# Patient Record
Sex: FEMALE | Race: WHITE | NOT HISPANIC OR LATINO | Employment: FULL TIME | URBAN - METROPOLITAN AREA
[De-identification: names, ages, dates, MRNs, and addresses within clinical notes are randomized per-mention and may not be internally consistent; named-entity substitution may affect disease eponyms.]

---

## 2017-07-12 PROBLEM — N83.201 RIGHT OVARIAN CYST: Status: ACTIVE | Noted: 2017-07-12

## 2017-07-13 RX ORDER — ACETAMINOPHEN AND CODEINE PHOSPHATE 120; 12 MG/5ML; MG/5ML
1 SOLUTION ORAL DAILY
COMMUNITY
End: 2021-01-18

## 2017-07-19 ENCOUNTER — HOSPITAL ENCOUNTER (OUTPATIENT)
Dept: LAB | Facility: CLINIC | Age: 27
Discharge: HOME | End: 2017-07-19
Attending: OBSTETRICS & GYNECOLOGY | Admitting: OBSTETRICS & GYNECOLOGY
Payer: COMMERCIAL

## 2017-07-19 DIAGNOSIS — Z01.812 PRE-OPERATIVE LABORATORY EXAMINATION: ICD-10-CM

## 2017-07-19 LAB
ABO + RH BLD: NORMAL
ABO + RH BLD: NORMAL
BLD GP AB SCN SERPL QL: NORMAL
BLOOD BANK CMNT PATIENT-IMP: NORMAL
HGB BLD-MCNC: 13.4 G/DL (ref 11.7–15.7)
SPECIMEN EXP DATE BLD: NORMAL

## 2017-07-19 PROCEDURE — 86900 BLOOD TYPING SEROLOGIC ABO: CPT | Performed by: OBSTETRICS & GYNECOLOGY

## 2017-07-19 PROCEDURE — 85018 HEMOGLOBIN: CPT | Performed by: OBSTETRICS & GYNECOLOGY

## 2017-07-19 PROCEDURE — 86901 BLOOD TYPING SEROLOGIC RH(D): CPT | Performed by: OBSTETRICS & GYNECOLOGY

## 2017-07-19 PROCEDURE — 86850 RBC ANTIBODY SCREEN: CPT | Performed by: OBSTETRICS & GYNECOLOGY

## 2017-07-19 PROCEDURE — 36415 COLL VENOUS BLD VENIPUNCTURE: CPT | Performed by: OBSTETRICS & GYNECOLOGY

## 2017-07-20 ENCOUNTER — ANESTHESIA EVENT (OUTPATIENT)
Dept: SURGERY | Facility: CLINIC | Age: 27
End: 2017-07-20
Payer: COMMERCIAL

## 2017-07-20 ENCOUNTER — HOSPITAL ENCOUNTER (OUTPATIENT)
Facility: CLINIC | Age: 27
Discharge: HOME | End: 2017-07-20
Attending: OBSTETRICS & GYNECOLOGY | Admitting: OBSTETRICS & GYNECOLOGY
Payer: COMMERCIAL

## 2017-07-20 ENCOUNTER — ANESTHESIA (OUTPATIENT)
Dept: SURGERY | Facility: CLINIC | Age: 27
End: 2017-07-20
Payer: COMMERCIAL

## 2017-07-20 VITALS
HEIGHT: 65 IN | WEIGHT: 199 LBS | SYSTOLIC BLOOD PRESSURE: 142 MMHG | RESPIRATION RATE: 16 BRPM | DIASTOLIC BLOOD PRESSURE: 97 MMHG | HEART RATE: 65 BPM | TEMPERATURE: 97.4 F | BODY MASS INDEX: 33.15 KG/M2 | OXYGEN SATURATION: 98 %

## 2017-07-20 DIAGNOSIS — G89.18 POSTOPERATIVE PAIN: ICD-10-CM

## 2017-07-20 DIAGNOSIS — N83.8 PARATUBAL CYST: Primary | ICD-10-CM

## 2017-07-20 DIAGNOSIS — K66.0 ABDOMINAL ADHESIONS: ICD-10-CM

## 2017-07-20 LAB — HCG SERPL QL: NEGATIVE

## 2017-07-20 PROCEDURE — 37000008 ZZH ANESTHESIA TECHNICAL FEE, 1ST 30 MIN: Performed by: OBSTETRICS & GYNECOLOGY

## 2017-07-20 PROCEDURE — 25000566 ZZH SEVOFLURANE, EA 15 MIN: Performed by: OBSTETRICS & GYNECOLOGY

## 2017-07-20 PROCEDURE — 88304 TISSUE EXAM BY PATHOLOGIST: CPT | Performed by: OBSTETRICS & GYNECOLOGY

## 2017-07-20 PROCEDURE — 36415 COLL VENOUS BLD VENIPUNCTURE: CPT | Performed by: OBSTETRICS & GYNECOLOGY

## 2017-07-20 PROCEDURE — 25000125 ZZHC RX 250: Performed by: OBSTETRICS & GYNECOLOGY

## 2017-07-20 PROCEDURE — 88304 TISSUE EXAM BY PATHOLOGIST: CPT | Mod: 26 | Performed by: OBSTETRICS & GYNECOLOGY

## 2017-07-20 PROCEDURE — S0020 INJECTION, BUPIVICAINE HYDRO: HCPCS | Performed by: OBSTETRICS & GYNECOLOGY

## 2017-07-20 PROCEDURE — 36000085 ZZH SURGERY LEVEL 8 1ST 30 MIN: Performed by: OBSTETRICS & GYNECOLOGY

## 2017-07-20 PROCEDURE — 37000009 ZZH ANESTHESIA TECHNICAL FEE, EACH ADDTL 15 MIN: Performed by: OBSTETRICS & GYNECOLOGY

## 2017-07-20 PROCEDURE — 25800025 ZZH RX 258: Performed by: OBSTETRICS & GYNECOLOGY

## 2017-07-20 PROCEDURE — 25000128 H RX IP 250 OP 636: Performed by: NURSE ANESTHETIST, CERTIFIED REGISTERED

## 2017-07-20 PROCEDURE — 25000128 H RX IP 250 OP 636: Performed by: ANESTHESIOLOGY

## 2017-07-20 PROCEDURE — 27210794 ZZH OR GENERAL SUPPLY STERILE: Performed by: OBSTETRICS & GYNECOLOGY

## 2017-07-20 PROCEDURE — 71000012 ZZH RECOVERY PHASE 1 LEVEL 1 FIRST HR: Performed by: OBSTETRICS & GYNECOLOGY

## 2017-07-20 PROCEDURE — 40000306 ZZH STATISTIC PRE PROC ASSESS II: Performed by: OBSTETRICS & GYNECOLOGY

## 2017-07-20 PROCEDURE — 25000125 ZZHC RX 250: Performed by: NURSE ANESTHETIST, CERTIFIED REGISTERED

## 2017-07-20 PROCEDURE — 27211024 ZZHC OR SUPPLY OTHER OPNP: Performed by: OBSTETRICS & GYNECOLOGY

## 2017-07-20 PROCEDURE — 84703 CHORIONIC GONADOTROPIN ASSAY: CPT | Performed by: OBSTETRICS & GYNECOLOGY

## 2017-07-20 PROCEDURE — 25000132 ZZH RX MED GY IP 250 OP 250 PS 637: Performed by: OBSTETRICS & GYNECOLOGY

## 2017-07-20 PROCEDURE — 71000013 ZZH RECOVERY PHASE 1 LEVEL 1 EA ADDTL HR: Performed by: OBSTETRICS & GYNECOLOGY

## 2017-07-20 PROCEDURE — 25000128 H RX IP 250 OP 636: Performed by: OBSTETRICS & GYNECOLOGY

## 2017-07-20 PROCEDURE — 71000027 ZZH RECOVERY PHASE 2 EACH 15 MINS: Performed by: OBSTETRICS & GYNECOLOGY

## 2017-07-20 PROCEDURE — 36000087 ZZH SURGERY LEVEL 8 EA 15 ADDTL MIN: Performed by: OBSTETRICS & GYNECOLOGY

## 2017-07-20 RX ORDER — ACETAMINOPHEN 325 MG/1
975 TABLET ORAL ONCE
Status: COMPLETED | OUTPATIENT
Start: 2017-07-20 | End: 2017-07-20

## 2017-07-20 RX ORDER — FENTANYL CITRATE 50 UG/ML
25-50 INJECTION, SOLUTION INTRAMUSCULAR; INTRAVENOUS
Status: DISCONTINUED | OUTPATIENT
Start: 2017-07-20 | End: 2017-07-20 | Stop reason: HOSPADM

## 2017-07-20 RX ORDER — ONDANSETRON 2 MG/ML
4 INJECTION INTRAMUSCULAR; INTRAVENOUS EVERY 30 MIN PRN
Status: DISCONTINUED | OUTPATIENT
Start: 2017-07-20 | End: 2017-07-20 | Stop reason: HOSPADM

## 2017-07-20 RX ORDER — OXYCODONE AND ACETAMINOPHEN 5; 325 MG/1; MG/1
1 TABLET ORAL ONCE
Status: COMPLETED | OUTPATIENT
Start: 2017-07-20 | End: 2017-07-20

## 2017-07-20 RX ORDER — GLYCOPYRROLATE 0.2 MG/ML
INJECTION, SOLUTION INTRAMUSCULAR; INTRAVENOUS PRN
Status: DISCONTINUED | OUTPATIENT
Start: 2017-07-20 | End: 2017-07-20

## 2017-07-20 RX ORDER — SODIUM CHLORIDE, SODIUM LACTATE, POTASSIUM CHLORIDE, CALCIUM CHLORIDE 600; 310; 30; 20 MG/100ML; MG/100ML; MG/100ML; MG/100ML
INJECTION, SOLUTION INTRAVENOUS CONTINUOUS
Status: DISCONTINUED | OUTPATIENT
Start: 2017-07-20 | End: 2017-07-20 | Stop reason: HOSPADM

## 2017-07-20 RX ORDER — PROPOFOL 10 MG/ML
INJECTION, EMULSION INTRAVENOUS PRN
Status: DISCONTINUED | OUTPATIENT
Start: 2017-07-20 | End: 2017-07-20

## 2017-07-20 RX ORDER — DEXAMETHASONE SODIUM PHOSPHATE 4 MG/ML
INJECTION, SOLUTION INTRA-ARTICULAR; INTRALESIONAL; INTRAMUSCULAR; INTRAVENOUS; SOFT TISSUE PRN
Status: DISCONTINUED | OUTPATIENT
Start: 2017-07-20 | End: 2017-07-20

## 2017-07-20 RX ORDER — NALOXONE HYDROCHLORIDE 0.4 MG/ML
.1-.4 INJECTION, SOLUTION INTRAMUSCULAR; INTRAVENOUS; SUBCUTANEOUS
Status: DISCONTINUED | OUTPATIENT
Start: 2017-07-20 | End: 2017-07-20 | Stop reason: HOSPADM

## 2017-07-20 RX ORDER — CEFAZOLIN SODIUM 1 G/3ML
1 INJECTION, POWDER, FOR SOLUTION INTRAMUSCULAR; INTRAVENOUS SEE ADMIN INSTRUCTIONS
Status: DISCONTINUED | OUTPATIENT
Start: 2017-07-20 | End: 2017-07-20 | Stop reason: HOSPADM

## 2017-07-20 RX ORDER — BUPIVACAINE HYDROCHLORIDE 5 MG/ML
INJECTION, SOLUTION EPIDURAL; INTRACAUDAL PRN
Status: DISCONTINUED | OUTPATIENT
Start: 2017-07-20 | End: 2017-07-20 | Stop reason: HOSPADM

## 2017-07-20 RX ORDER — ONDANSETRON 4 MG/1
4 TABLET, ORALLY DISINTEGRATING ORAL EVERY 30 MIN PRN
Status: DISCONTINUED | OUTPATIENT
Start: 2017-07-20 | End: 2017-07-20 | Stop reason: HOSPADM

## 2017-07-20 RX ORDER — FENTANYL CITRATE 50 UG/ML
INJECTION, SOLUTION INTRAMUSCULAR; INTRAVENOUS PRN
Status: DISCONTINUED | OUTPATIENT
Start: 2017-07-20 | End: 2017-07-20

## 2017-07-20 RX ORDER — IBUPROFEN 600 MG/1
600 TABLET, FILM COATED ORAL
Status: DISCONTINUED | OUTPATIENT
Start: 2017-07-20 | End: 2017-07-20 | Stop reason: HOSPADM

## 2017-07-20 RX ORDER — IBUPROFEN 600 MG/1
600 TABLET, FILM COATED ORAL EVERY 6 HOURS PRN
Qty: 30 TABLET | Refills: 0 | Status: SHIPPED | OUTPATIENT
Start: 2017-07-20 | End: 2022-05-12

## 2017-07-20 RX ORDER — OXYCODONE AND ACETAMINOPHEN 5; 325 MG/1; MG/1
1-2 TABLET ORAL
Status: COMPLETED | OUTPATIENT
Start: 2017-07-20 | End: 2017-07-20

## 2017-07-20 RX ORDER — OXYCODONE AND ACETAMINOPHEN 5; 325 MG/1; MG/1
1-2 TABLET ORAL EVERY 6 HOURS PRN
Qty: 20 TABLET | Refills: 0 | Status: SHIPPED | OUTPATIENT
Start: 2017-07-20 | End: 2021-01-18

## 2017-07-20 RX ORDER — CEFAZOLIN SODIUM 2 G/100ML
2 INJECTION, SOLUTION INTRAVENOUS
Status: COMPLETED | OUTPATIENT
Start: 2017-07-20 | End: 2017-07-20

## 2017-07-20 RX ORDER — PROPOFOL 10 MG/ML
INJECTION, EMULSION INTRAVENOUS CONTINUOUS PRN
Status: DISCONTINUED | OUTPATIENT
Start: 2017-07-20 | End: 2017-07-20

## 2017-07-20 RX ORDER — LIDOCAINE HYDROCHLORIDE 10 MG/ML
INJECTION, SOLUTION INFILTRATION; PERINEURAL PRN
Status: DISCONTINUED | OUTPATIENT
Start: 2017-07-20 | End: 2017-07-20

## 2017-07-20 RX ORDER — MEPERIDINE HYDROCHLORIDE 25 MG/ML
12.5 INJECTION INTRAMUSCULAR; INTRAVENOUS; SUBCUTANEOUS
Status: DISCONTINUED | OUTPATIENT
Start: 2017-07-20 | End: 2017-07-20 | Stop reason: HOSPADM

## 2017-07-20 RX ADMIN — SODIUM CHLORIDE, POTASSIUM CHLORIDE, SODIUM LACTATE AND CALCIUM CHLORIDE: 600; 310; 30; 20 INJECTION, SOLUTION INTRAVENOUS at 12:26

## 2017-07-20 RX ADMIN — OXYCODONE HYDROCHLORIDE AND ACETAMINOPHEN 1 TABLET: 5; 325 TABLET ORAL at 13:50

## 2017-07-20 RX ADMIN — PROPOFOL 50 MCG/KG/MIN: 10 INJECTION, EMULSION INTRAVENOUS at 11:19

## 2017-07-20 RX ADMIN — SODIUM CHLORIDE, POTASSIUM CHLORIDE, SODIUM LACTATE AND CALCIUM CHLORIDE: 600; 310; 30; 20 INJECTION, SOLUTION INTRAVENOUS at 11:06

## 2017-07-20 RX ADMIN — HYDROMORPHONE HYDROCHLORIDE 0.5 MG: 1 INJECTION, SOLUTION INTRAMUSCULAR; INTRAVENOUS; SUBCUTANEOUS at 11:45

## 2017-07-20 RX ADMIN — HYDROMORPHONE HYDROCHLORIDE 0.5 MG: 1 INJECTION, SOLUTION INTRAMUSCULAR; INTRAVENOUS; SUBCUTANEOUS at 11:41

## 2017-07-20 RX ADMIN — PROPOFOL 200 MG: 10 INJECTION, EMULSION INTRAVENOUS at 11:16

## 2017-07-20 RX ADMIN — ACETAMINOPHEN 975 MG: 325 TABLET, FILM COATED ORAL at 10:36

## 2017-07-20 RX ADMIN — GLYCOPYRROLATE 0.2 MG: 0.2 INJECTION, SOLUTION INTRAMUSCULAR; INTRAVENOUS at 11:16

## 2017-07-20 RX ADMIN — OXYCODONE HYDROCHLORIDE AND ACETAMINOPHEN 1 TABLET: 5; 325 TABLET ORAL at 16:22

## 2017-07-20 RX ADMIN — LIDOCAINE HYDROCHLORIDE 30 MG: 10 INJECTION, SOLUTION INFILTRATION; PERINEURAL at 11:16

## 2017-07-20 RX ADMIN — ROCURONIUM BROMIDE 40 MG: 10 INJECTION INTRAVENOUS at 11:16

## 2017-07-20 RX ADMIN — FENTANYL CITRATE 100 MCG: 50 INJECTION, SOLUTION INTRAMUSCULAR; INTRAVENOUS at 11:16

## 2017-07-20 RX ADMIN — MIDAZOLAM HYDROCHLORIDE 2 MG: 1 INJECTION, SOLUTION INTRAMUSCULAR; INTRAVENOUS at 11:06

## 2017-07-20 RX ADMIN — SODIUM CHLORIDE, POTASSIUM CHLORIDE, SODIUM LACTATE AND CALCIUM CHLORIDE: 600; 310; 30; 20 INJECTION, SOLUTION INTRAVENOUS at 16:19

## 2017-07-20 RX ADMIN — CEFAZOLIN SODIUM 2 G: 2 INJECTION, SOLUTION INTRAVENOUS at 11:10

## 2017-07-20 RX ADMIN — FENTANYL CITRATE 50 MCG: 50 INJECTION INTRAMUSCULAR; INTRAVENOUS at 13:12

## 2017-07-20 RX ADMIN — FENTANYL CITRATE 50 MCG: 50 INJECTION INTRAMUSCULAR; INTRAVENOUS at 13:26

## 2017-07-20 RX ADMIN — DEXAMETHASONE SODIUM PHOSPHATE 8 MG: 4 INJECTION, SOLUTION INTRA-ARTICULAR; INTRALESIONAL; INTRAMUSCULAR; INTRAVENOUS; SOFT TISSUE at 11:16

## 2017-07-20 ASSESSMENT — ENCOUNTER SYMPTOMS: DYSRHYTHMIAS: 0

## 2017-07-20 NOTE — DISCHARGE INSTRUCTIONS
GENERAL ANESTHESIA OR SEDATION ADULT DISCHARGE INSTRUCTIONS   SPECIAL PRECAUTIONS FOR 24 HOURS AFTER SURGERY    IT IS NOT UNUSUAL TO FEEL LIGHT-HEADED OR FAINT, UP TO 24 HOURS AFTER SURGERY OR WHILE TAKING PAIN MEDICATION.  IF YOU HAVE THESE SYMPTOMS; SIT FOR A FEW MINUTES BEFORE STANDING AND HAVE SOMEONE ASSIST YOU WHEN YOU GET UP TO WALK OR USE THE BATHROOM.    YOU SHOULD REST AND RELAX FOR THE NEXT 24 HOURS AND YOU MUST MAKE ARRANGEMENTS TO HAVE SOMEONE STAY WITH YOU FOR AT LEAST 24 HOURS AFTER YOUR DISCHARGE.  AVOID HAZARDOUS AND STRENUOUS ACTIVITIES.  DO NOT MAKE IMPORTANT DECISIONS FOR 24 HOURS.    DO NOT DRIVE ANY VEHICLE OR OPERATE MECHANICAL EQUIPMENT FOR 24 HOURS FOLLOWING THE END OF YOUR SURGERY.  EVEN THOUGH YOU MAY FEEL NORMAL, YOUR REACTIONS MAY BE AFFECTED BY THE MEDICATION YOU HAVE RECEIVED.    DO NOT DRINK ALCOHOLIC BEVERAGES FOR 24 HOURS FOLLOWING YOUR SURGERY.    DRINK CLEAR LIQUIDS (APPLE JUICE, GINGER ALE, 7-UP, BROTH, ETC.).  PROGRESS TO YOUR REGULAR DIET AS YOU FEEL ABLE.    YOU MAY HAVE A DRY MOUTH, A SORE THROAT, MUSCLES ACHES OR TROUBLE SLEEPING.  THESE SHOULD GO AWAY AFTER 24 HOURS.    CALL YOUR DOCTOR FOR ANY OF THE FOLLOWING:  SIGNS OF INFECTION (FEVER, GROWING TENDERNESS AT THE SURGERY SITE, A LARGE AMOUNT OF DRAINAGE OR BLEEDING, SEVERE PAIN, FOUL-SMELLING DRAINAGE, REDNESS OR SWELLING.    IT HAS BEEN OVER 8 TO 10 HOURS SINCE SURGERY AND YOU ARE STILL NOT ABLE TO URINATE (PASS WATER).     LAPAROSCOPY, HYSTEROSCOPY OR PELVISCOPY DISCHARGE INSTRUCTIONS    PLEASE RETURN TO THE CLINIC IN:  ____1 WEEK  ____2 WEEKS  ____4 WEEKS  ____6 WEEKS  MAKE THIS APPOINTMENT AFTER YOU GET HOME IF IT HAS NOT ALREADY BEEN SCHEDULED.    DO NOT DRIVE A CAR, DRINK ALCOHOL OR USE MACHINERY FOR THE NEXT 24 HOURS.  YOU SHOULD WAIT UNTIL YOU HAVE RECOVERED BEFORE MAKING ANY IMPORTANT DECISIONS.    PAIN  YOU MAY HAVE CRAMPS, SHOULDER PAIN OR A LOW BACKACHE FOR 24 TO 48 HOURS.  TYLENOL (ACETAMINOPHEN) OR MOTRIN  (IBUPROFEN) MAY HELP, OR YOUR DOCTOR MAY GIVE YOU PAIN MEDICINE.  CALL YOUR DOCTOR IF PAIN CANNOT BE CONTROLLED.    BLEEDING OR VAGINAL DISCHARGE  YOU MAY HAVE SOME BLEEDING OR DISCHARGE FOR UP TO A WEEK OR LONGER.  DO NOT DOUCHE, USE TAMPONS OR HAVE SEX (INTERCOURSE) FOR ______DAYS.  CALL YOUR DOCTOR IF YOU SOAK MORE THAN ONE MAXI PAD (SANITARY NAPKIN) PER HOUR, OR IF YOU PASS LARGE BLOOD CLOTS.    FEVER  YOU MAY HAVE A LOW FEVER FOR THE FIRST TWO DAYS.  CALL YOUR DOCTOR IF IT GOES OVER 101 DEGREES.    NAUSEA  IF YOU HAVE NAUSEA (FEEL SICK TO YOUR STOMACH), STAY IN BED.  TRY DRINKING A SMALL AMOUNT OF 7-UP, TEA OR SOUP.    SWOLLEN BELLY  IF YOUR ABDOMEN (BELLY AREA) FEELS FIRM OR SWOLLEN, CALL YOUR DOCTOR.    DIZZINESS AND WEAKNESS  YOU MAY FEEL DIZZY OR WEAK FOR A FEW DAYS.  IF SO, YOU SHOULD REST OFTEN, STAND UP SLOWLY AND USE CARE WHEN CLIMBING STAIRS.    DIET AND ACTIVITY  EAT LIGHT MEALS AND DRINK PLENTY OF FLUIDS FOR THE FIRST 24 HOURS (OR LONGER, IF YOU HAVE NAUSEA).  WAIT 5 DAYS BEFORE BATHING.  SHOWERS ARE OKAY.  MOST WOMEN CAN RETURN TO WORK AFTER 24 HOURS.  YOU MAY GO BACK TO YOUR OTHER ACTIVITIES AFTER YOUR PAIN GOES AWAY.      IF YOU HAVE STITCHES  YOU MAY REMOVE YOUR BANDAGE THE DAY AFTER TREATMENT.  YOUR DOCTOR WILL TELL YOU IF YOUR STITCHES NEED TO BE REMOVED.  SOME STITCHES DISSOLVE OVER TIME.           Maximum acetaminophen (Tylenol) dose from all sources should not exceed 4 grams (4000 mg) per day.  You received 975 mg at 10:35 am   1 table of percocet given at 1:50 pm

## 2017-07-20 NOTE — BRIEF OP NOTE
[facility] Brief Operative Note    Pre-operative diagnosis: Right ovarian cyst   Post-operative diagnosis Right paratubal cyst, Extensive abdominal adhesions   Procedure: Procedure(s):  Anticipated Davinci robotic-assisted laparoscopic right ovarian cystectomy converted to Laparoscopic right paratubal cystectomy, Extensive Lysis of adhesions - Wound Class: II-Clean Contaminated   Surgeon(s): Surgeon(s) and Role:     * Jose Clark MD - Primary   Estimated blood loss: * No values recorded between 7/20/2017 11:34 AM and 7/20/2017 12:39 PM *    Specimens:   ID Type Source Tests Collected by Time Destination   A : right peritubular cyst Tissue Fallopian Tube, Right SURGICAL PATHOLOGY EXAM Jose Clark MD 7/20/2017 12:35 PM       Findings: EUA -uterus NSSC, AV, no adnexal masses; Scope - extensive omental and mesenteric adhesions to anterior abdominal wall obscuring the pelvis requiring adhesiolysis, normal uterus, normal right tube except 2 cm lobulated paratubal cyst, normal right ovary.  Left tube s/p partial salpingectomy.  Left ovary adherent to left side wall and partially obscured by sigmoid colon but o/w normal.  CDS normal.  Normal upper abd except s/p cholecystectomy.

## 2017-07-20 NOTE — ANESTHESIA POSTPROCEDURE EVALUATION
Patient: Shagufta Cristina    Procedure(s):  Anticapated Davinci  robotic assisted laparoscopic right ovarian cystectomy converted to  Laproscopic right peritubular cystectomy, extensive release of adhesions - Wound Class: II-Clean Contaminated    Diagnosis:right ovarian cyst  Diagnosis Additional Information: Pre-operative diagnosis: Right ovarian cyst  Post-operative diagnosis Right paratubal cyst, Extensive abdominal adhesions  Procedure: Procedure(s):  Anticapated Davinci  robotic assisted laparoscopic right ovarian cystectomy converted to  Laproscopic,  right paratubal cystectomy, Extensive Lysis of adhesions - Wound Class: II-Clean Contaminated        Anesthesia Type:  General, ETT    Note:  Anesthesia Post Evaluation    Patient location during evaluation: Phase 2  Patient participation: Able to fully participate in evaluation  Level of consciousness: awake  Pain management: adequate  Airway patency: patent  Cardiovascular status: acceptable  Respiratory status: acceptable  Hydration status: euvolemic  PONV: controlled     Anesthetic complications: None          Last vitals:  Vitals:    07/20/17 1326 07/20/17 1410 07/20/17 1622   BP:  (!) 143/95 131/86   Pulse:      Resp:  16 16   Temp:  97.2  F (36.2  C)    SpO2: 97% 98% 98%         Electronically Signed By: Javi Dixon MD  July 20, 2017  5:23 PM

## 2017-07-20 NOTE — ANESTHESIA CARE TRANSFER NOTE
Patient: Shagufta Cristina    Procedure(s):  Anticapated Davinci  robotic assisted laparoscopic right ovarian cystectomy converted to  Laproscopic right peritubular cystectomy, extensive release of adhesions - Wound Class: II-Clean Contaminated    Diagnosis: right ovarian cyst  Diagnosis Additional Information: No value filed.    Anesthesia Type:   General, ETT     Note:  Airway :Face Mask  Patient transferred to:PACU        Vitals: (Last set prior to Anesthesia Care Transfer)    CRNA VITALS  7/20/2017 1212 - 7/20/2017 1249      7/20/2017             NIBP: 122/71    NIBP Mean: 89                Electronically Signed By: DAQUAN Hqa CRNA  July 20, 2017  12:49 PM

## 2017-07-20 NOTE — IP AVS SNAPSHOT
MRN:[id number]                      After Visit Summary   7/20/2017    Shagufta Cristina    MRN: [id number]           Thank you!     Thank you for choosing [facility] for your care. Our goal is always to provide you with excellent care. Hearing back from our patients is one way we can continue to improve our services. Please take a few minutes to complete the written survey that you may receive in the mail after you visit. If you would like to speak to someone directly about your visit please contact Patient Relations at [phone]. Thank you!          Patient Information     Date Of Birth          1990        About your hospital stay     You were admitted on:  July 20, 2017 You last received care in the:  [facility]    You were discharged on:  July 20, 2017       Who to Call     For medical emergencies, please call 911.  For non-urgent questions about your medical care, please call your primary care provider or clinic, None  For questions related to your surgery, please call your surgery clinic        Attending Provider     Provider Specialty    Jose Clark MD OB/Gyn       Primary Care Provider    [facility]      After Care Instructions     Discharge Instructions       Pelvic Rest. No tampons, douching or intercourse for 2 weeks.            Discharge Instructions       Patient may return to work POD  10            Discharge Instructions       Patient to arrange follow up appointment if needed.            Shower       Shower on Post-op day  1.   DO NOT take a bath for 2 weeks.                  Further instructions from your care team       GENERAL ANESTHESIA OR SEDATION ADULT DISCHARGE INSTRUCTIONS   SPECIAL PRECAUTIONS FOR 24 HOURS AFTER SURGERY    IT IS NOT UNUSUAL TO FEEL LIGHT-HEADED OR FAINT, UP TO 24 HOURS AFTER SURGERY OR WHILE TAKING PAIN MEDICATION.  IF YOU HAVE THESE SYMPTOMS; SIT FOR A FEW MINUTES BEFORE STANDING AND HAVE SOMEONE ASSIST  YOU WHEN YOU GET UP TO WALK OR USE THE BATHROOM.    YOU SHOULD REST AND RELAX FOR THE NEXT 24 HOURS AND YOU MUST MAKE ARRANGEMENTS TO HAVE SOMEONE STAY WITH YOU FOR AT LEAST 24 HOURS AFTER YOUR DISCHARGE.  AVOID HAZARDOUS AND STRENUOUS ACTIVITIES.  DO NOT MAKE IMPORTANT DECISIONS FOR 24 HOURS.    DO NOT DRIVE ANY VEHICLE OR OPERATE MECHANICAL EQUIPMENT FOR 24 HOURS FOLLOWING THE END OF YOUR SURGERY.  EVEN THOUGH YOU MAY FEEL NORMAL, YOUR REACTIONS MAY BE AFFECTED BY THE MEDICATION YOU HAVE RECEIVED.    DO NOT DRINK ALCOHOLIC BEVERAGES FOR 24 HOURS FOLLOWING YOUR SURGERY.    DRINK CLEAR LIQUIDS (APPLE JUICE, GINGER ALE, 7-UP, BROTH, ETC.).  PROGRESS TO YOUR REGULAR DIET AS YOU FEEL ABLE.    YOU MAY HAVE A DRY MOUTH, A SORE THROAT, MUSCLES ACHES OR TROUBLE SLEEPING.  THESE SHOULD GO AWAY AFTER 24 HOURS.    CALL YOUR DOCTOR FOR ANY OF THE FOLLOWING:  SIGNS OF INFECTION (FEVER, GROWING TENDERNESS AT THE SURGERY SITE, A LARGE AMOUNT OF DRAINAGE OR BLEEDING, SEVERE PAIN, FOUL-SMELLING DRAINAGE, REDNESS OR SWELLING.    IT HAS BEEN OVER 8 TO 10 HOURS SINCE SURGERY AND YOU ARE STILL NOT ABLE TO URINATE (PASS WATER).     LAPAROSCOPY, HYSTEROSCOPY OR PELVISCOPY DISCHARGE INSTRUCTIONS    PLEASE RETURN TO THE CLINIC IN:  ____1 WEEK  ____2 WEEKS  ____4 WEEKS  ____6 WEEKS  MAKE THIS APPOINTMENT AFTER YOU GET HOME IF IT HAS NOT ALREADY BEEN SCHEDULED.    DO NOT DRIVE A CAR, DRINK ALCOHOL OR USE MACHINERY FOR THE NEXT 24 HOURS.  YOU SHOULD WAIT UNTIL YOU HAVE RECOVERED BEFORE MAKING ANY IMPORTANT DECISIONS.    PAIN  YOU MAY HAVE CRAMPS, SHOULDER PAIN OR A LOW BACKACHE FOR 24 TO 48 HOURS.  TYLENOL (ACETAMINOPHEN) OR MOTRIN (IBUPROFEN) MAY HELP, OR YOUR DOCTOR MAY GIVE YOU PAIN MEDICINE.  CALL YOUR DOCTOR IF PAIN CANNOT BE CONTROLLED.    BLEEDING OR VAGINAL DISCHARGE  YOU MAY HAVE SOME BLEEDING OR DISCHARGE FOR UP TO A WEEK OR LONGER.  DO NOT DOUCHE, USE TAMPONS OR HAVE SEX (INTERCOURSE) FOR ______DAYS.  CALL YOUR DOCTOR IF YOU SOAK  "MORE THAN ONE MAXI PAD (SANITARY NAPKIN) PER HOUR, OR IF YOU PASS LARGE BLOOD CLOTS.    FEVER  YOU MAY HAVE A LOW FEVER FOR THE FIRST TWO DAYS.  CALL YOUR DOCTOR IF IT GOES OVER 101 DEGREES.    NAUSEA  IF YOU HAVE NAUSEA (FEEL SICK TO YOUR STOMACH), STAY IN BED.  TRY DRINKING A SMALL AMOUNT OF 7-UP, TEA OR SOUP.    SWOLLEN BELLY  IF YOUR ABDOMEN (BELLY AREA) FEELS FIRM OR SWOLLEN, CALL YOUR DOCTOR.    DIZZINESS AND WEAKNESS  YOU MAY FEEL DIZZY OR WEAK FOR A FEW DAYS.  IF SO, YOU SHOULD REST OFTEN, STAND UP SLOWLY AND USE CARE WHEN CLIMBING STAIRS.    DIET AND ACTIVITY  EAT LIGHT MEALS AND DRINK PLENTY OF FLUIDS FOR THE FIRST 24 HOURS (OR LONGER, IF YOU HAVE NAUSEA).  WAIT 5 DAYS BEFORE BATHING.  SHOWERS ARE OKAY.  MOST WOMEN CAN RETURN TO WORK AFTER 24 HOURS.  YOU MAY GO BACK TO YOUR OTHER ACTIVITIES AFTER YOUR PAIN GOES AWAY.      IF YOU HAVE STITCHES  YOU MAY REMOVE YOUR BANDAGE THE DAY AFTER TREATMENT.  YOUR DOCTOR WILL TELL YOU IF YOUR STITCHES NEED TO BE REMOVED.  SOME STITCHES DISSOLVE OVER TIME.           Maximum acetaminophen (Tylenol) dose from all sources should not exceed 4 grams (4000 mg) per day.  You received 975 mg at 10:35 am   1 table of percocet given at 1:50 pm            Pending Results     Date and Time Order Name Status Description    7/20/2017 1221 Surgical pathology exam In process             Admission Information     Date & Time Provider Department Dept. Phone    7/20/2017 Jose Clark MD [facility] [phone]      Your Vitals Were     Blood Pressure Pulse Temperature Respirations Height Weight    144/85 65 97  F (36.1  C) (Temporal) 15 1.651 m (5' 5\") 90.3 kg (199 lb)    Last Period Pulse Oximetry BMI (Body Mass Index)             07/06/2017 97% 33.12 kg/m2         Work[employer] Information     [employer] lets you send messages to your doctor, view your test results, renew your prescriptions, schedule appointments and more. To sign up, go to [email or web address] " ". Click on \"Log in\" on the left side of the screen, which will take you to the Welcome page. Then click on \"Sign up Now\" on the right side of the page.     You will be asked to enter the access code listed below, as well as some personal information. Please follow the directions to create your username and password.     Your access code is: [email or web address]  Expires: 10/18/2017  2:03 PM     Your access code will  in 90 days. If you need help or a new code, please call your [place] clinic or [phone].        Care EveryWhere ID     This is your Care EveryWhere ID. This could be used by other organizations to access your [place] medical records  [id number]        Equal Access to Services     ANNABEL SUGGS : Delmar Cristina, washobha hamilton, pj amesalgautam valencia, lina ashford . So [facility] [phone].    ATENCIÓN: Si habla español, tiene a barbour disposición servicios gratuitos de asistencia lingüística. Llame al [phone].    We comply with applicable federal civil rights laws and [place] laws. We do not discriminate on the basis of race, color, national origin, age, disability sex, sexual orientation or gender identity.               Review of your medicines      START taking        Dose / Directions    oxyCODONE-acetaminophen 5-325 MG per tablet   Commonly known as:  PERCOCET   Used for:  Postoperative pain        Dose:  1-2 tablet   Take 1-2 tablets by mouth every 6 hours as needed for moderate to severe pain   Quantity:  20 tablet   Refills:  0         CONTINUE these medicines which have NOT CHANGED        Dose / Directions    ibuprofen 600 MG tablet   Commonly known as:  ADVIL/MOTRIN   Used for:  Postoperative pain        Dose:  600 mg   Take 1 tablet (600 mg) by mouth every 6 hours as needed for pain Take w/ food   Quantity:  30 tablet   Refills:  0       norethindrone 0.35 MG per tablet   Commonly known as:  MICRONOR        Dose:  1 tablet   Take 1 tablet by " mouth daily   Refills:  0            Where to get your medicines      Some of these will need a paper prescription and others can be bought over the counter. Ask your nurse if you have questions.     Bring a paper prescription for each of these medications     ibuprofen 600 MG tablet    oxyCODONE-acetaminophen 5-325 MG per tablet                Protect others around you: Learn how to safely use, store and throw away your medicines at www.disposemymeds.org.             Medication List: This is a list of all your medications and when to take them. Check marks below indicate your daily home schedule. Keep this list as a reference.      Medications           Morning Afternoon Evening Bedtime As Needed    ibuprofen 600 MG tablet   Commonly known as:  ADVIL/MOTRIN   Take 1 tablet (600 mg) by mouth every 6 hours as needed for pain Take w/ food                                norethindrone 0.35 MG per tablet   Commonly known as:  MICRONOR   Take 1 tablet by mouth daily                                oxyCODONE-acetaminophen 5-325 MG per tablet   Commonly known as:  PERCOCET   Take 1-2 tablets by mouth every 6 hours as needed for moderate to severe pain   Last time this was given:  1 tablet on 7/20/2017  1:50 PM

## 2017-07-20 NOTE — ANESTHESIA PREPROCEDURE EVALUATION
Anesthesia Evaluation     . Pt has had prior anesthetic. Type: General    No history of anesthetic complications          ROS/MED HX    ENT/Pulmonary:      (-) asthma, sleep apnea and Other pulmonary disease   Neurologic:      (-) TIA, Other neuro hx and Dementia   Cardiovascular:        (-) hypertension, CAD, CHF, arrhythmias, pulmonary hypertension and dyslipidemia   METS/Exercise Tolerance:     Hematologic:        (-) anemia   Musculoskeletal:        (-) arthritis   GI/Hepatic:        (-) GERD, hiatal hernia and hepatitis   Renal/Genitourinary:     (+) Other Renal/ Genitourinary, Ovarian Cyst   (-) renal disease   Endo:     (+) Obesity, .   (-) Type I DM, Type II DM, thyroid disease and chronic steroid usage   Psychiatric:        (-) psychiatric history   Infectious Disease:  - neg infectious disease ROS       Malignancy:      - no malignancy   Other:    - neg other ROS                 Physical Exam      Airway   Mallampati: II  TM distance: >3 FB  Neck ROM: full    Dental     Cardiovascular   Rhythm and rate: regular and normal  (-) no murmur    Pulmonary    breath sounds clear to auscultation    Other findings: Lab Test        07/19/17 11/06/16 09/05/14                       1040          0130          0654          WBC           --          14.2*         --           HGB          13.4         13.5         11.7          MCV           --          86            --           PLT           --          308           --            Lab Test        11/06/16 09/05/14                       0130          0654          NA           137           --           POTASSIUM    3.6           --           CHLORIDE     105           --           CO2          25            --           BUN          11            --           CR           0.63         0.53          ANIONGAP     7             --           JES          8.7           --           GLC          103*          --                         Anesthesia  Plan      History & Physical Review  History and physical reviewed and following examination; no interval change.    ASA Status:  2 .    NPO Status:  > 8 hours    Plan for General and ETT with Propofol induction. Maintenance will be Balanced.    PONV prophylaxis:  Ondansetron (or other 5HT-3) and Dexamethasone or Solumedrol  Propofol gtt + Sevo + 60% O2 please      Postoperative Care  Postoperative pain management:  IV analgesics and Oral pain medications.      Consents  Anesthetic plan, risks, benefits and alternatives discussed with:  Patient..                          .

## 2017-07-20 NOTE — IP AVS SNAPSHOT
[facility]    [address]    [facility] [address]    Phone:  [phone]    Fax:  [phone]                                       After Visit Summary   7/20/2017    Shagufta Cristina    MRN: [id number]           After Visit Summary Signature Page     I have received my discharge instructions, and my questions have been answered. I have discussed any challenges I see with this plan with the nurse or doctor.    ..........................................................................................................................................  Patient/Patient Representative Signature      ..........................................................................................................................................  Patient Representative Print Name and Relationship to Patient    ..................................................               ................................................  Date                                            Time    ..........................................................................................................................................  Reviewed by Signature/Title    ...................................................              ..............................................  Date                                                            Time

## 2017-07-21 LAB — COPATH REPORT: NORMAL

## 2017-07-21 NOTE — OR NURSING
Pt had been having difficult time voiding.  Pt scanned for 350 and then rested.  Pt up walking in dept after that and had 1 bout of nausea with emesis.  Pt states coffee had upset her stomach.  Pt after emesis of coffee said felt much better.  Pt only vomitted sm amount and after denied further nausea.  Pt then in restroom, encouraged to pour warm water over there genitalia in attempt to try and void.  Pt then voided and states voided well.  Pt then returned to room and rescanned for less then 50 ml in various areas of the abd.  Pt wants to go home.  Pt home with mom and reiterated the instruction of voiding.  Encouraged her to drink well, eat small amount and encouraged voiding naturally and then if unable to attempt all items tried here and if unable to call Dr Clark office.  Pt and mom stated understanding and agreed.  Pt IV d/c'ed and home with mom at this time.

## 2017-07-23 NOTE — OP NOTE
Surgeon / Clinician: Jose Clark MD    DATE OF PROCEDURE:  2017    PREOPERATIVE DIAGNOSIS:  Right ovarian cyst.    POSTOPERATIVE DIAGNOSIS:    1.  Right paratubal cyst.  2.  Extensive abdominal adhesions.    PROCEDURE:  An anticipated Di Kulwant robotic-assisted laparoscopic right ovarian cystectomy converted to laparoscopic right paratubal cystectomy and extensive lysis of adhesions.      SURGEON:  Jose Clark MD     ANESTHESIA:  General endotracheal.      FLUIDS:  1100 mL lactated Ringer's and Ancef 2 grams IV preop.    ESTIMATED BLOOD LOSS:  5 mL     DRAINS:  None.    INDICATIONS FOR PROCEDURE:  Shagufta Cristina is a 26-year-old woman,  0, who has a history of having a partial left salpingectomy for a large paratubal cyst that had obliterated the left tube that resulted in pathology findings of a serocyst adenoma that was separate from the left ovary which was otherwise normal, and also history of previous laparoscopic cholecystectomy.  She now has had recurring persisting right lower quadrant pelvic pain with ultrasound showing either a multilocular or 2 separate right adnexal cysts, possibly involving the ovary, but could not be discerned as to possibly being a paratubal cyst as well.  These have persisted on serial ultrasounds and she would like to have it surgically removed with the primary goal to relieve her symptoms but also hopefully prevent recurrence of a new large paratubal cyst that might torse.  She also would like to have histologic diagnosis.  She knows that we will attempt to preserve her right tube and ovary so as to facilitate future pregnancy.  The patient understands that given the previous 2 significant abdominal surgeries that there is a chance for significant abdominal adhesions and thus I suggested that we consider setting up for a robotic-assisted laparoscopic right ovarian cystectomy initially with the intent of using it as necessary but the possibility of doing a  conventional laparoscopic procedure would still be an option if the circumstances dictate that.  The patient understands the indication for the above procedures as well as potential risk, such as bleeding, infection, injury to the bowel, bladder, uterus, remaining tube and both ovaries, as well as risk of general anesthesia including aspiration, malignant hyperthermia and death.  She accepts all these risks and has given informed consent.        FINDINGS:  On pelvic exam under anesthesia the uterus is normal size, shape, contour, anteverted with no obvious adnexal masses.  On laparoscopy there is extensive omental and mesenteric adhesion in the anterior abdominal wall from her previous laparotomy and possibly also from her laparoscopic cholecystectomy as there is an incision just underneath the previous supraumbilical site from that.  These required extensive adhesiolysis, taking up approximately 30 minutes of the procedure.  The uterus otherwise appeared normal.  The right fallopian tube appeared normal except for a 2 cm lobulated paratubal cyst rising off the ampulla but it will be  from the actual tube itself.  The right ovary was completely normal with no cysts whatsoever.  The left tube was status post partial salpingectomy.  The left ovary was adherent to the left pelvic side wall and partially obscured by the sigmoid colon but otherwise appeared normal.  The cul-de-sac appeared normal.  The upper abdomen was normal except she is status post cholecystectomy.      DETAILS OF PROCEDURE:  Appropriate patient identification and consent for procedure was obtained. Patient was taken to the operating room where adequate general endotracheal anesthesia was obtained. Patient placed in the dorsal lithotomy position, legs in Neeraj stirrups and pelvic exam under anesthesia was performed with the above noted findings. She was prepped and draped in normal sterile manner for this procedure. A Thakkar catheter was  placed. A single arm speculum was placed in the vagina to visualize the cervix which was grasped at 12 o'clock with a single tooth tenaculum in order to down-retract the uterus.  Hulka tenaculum was passed through the endocervical canal into uterine cavity and used to grasp the cervix at 12 o'clock and left in place for uterine manipulation during the procedure. The single tooth tenaculum and speculum were removed.    Attention was then turned to the abdomen. A Veress needle was passed through the umbilicus and peritoneal cavity and insufflation was commenced with CO2, opening pressures of 5 mmHg. After 1 liter was insufflated and low flow hypo-insufflation completed the process for a total of 2.5 liters. The Veress needle was removed. A right upper quadrant 5 mm incision was made with a knife and a AirSeal 5 mm Optiview trocar was inserted under direct visualization of laparoscope into peritoneal cavity without difficulty. AirSeal was then engaged and then the trocar is removed, leaving the port in place. The scope was reinserted for main proper port placement and no trauma to the underlying viscera. The above noted findings are ascertained. Given the extent of the adhesions, the robotic camera port was still going to be used because of a chance that we may decide to go forward with the robotic procedure initially. An 8 mm skin incision was made with the knife about 3 cm above the umbilicus, well above the omental adhesion and the robotic camera port was passed under direct visualization of laparoscope into peritoneal cavity without difficulty. It was at this time that I determined that it would be easiest and most technically feasible to lyse the adhesions initially using the Thunderbeat straight laparoscopic instruments as opposed to robotically because of the proximity of the adhesion to the robotic camera port. I left the 5 mm laparoscope in the right upper quadrant port to visualize the procedure. Using a  Thunderbeat vessel sealer the above noted omental adhesion and mesenteric adhesions in the lower abdomen were all lysed off the anterior abdominal wall, taking care to avoid bowel or any other viscera. Hemostasis was assured along the dissection. This whole process of adhesiolysis took about 30 minutes. Once the adhesions were completely lysed, attention was then turned to the right paratubal cyst.  It was at this time it became apparent that the paratubal cyst was the only finding of significance and was easily amenable to straight laparoscopic approach as opposed to robotic. Therefore, a 10 mm incision made in the left side of the abdomen with a 10 mm trocar was passed through the incision in the peritoneal cavity without difficulty under direct visualization of laparoscope. Using the 3 sites with the laparoscope in the mid port site and the upper abdomen and a grasper on the right side and the Thunderbeat on the left side, I was able to excise the paratubal cyst without difficulty, preserving the tube's integrity as well as the ovary. An EndoCatch bag was placed through the left side port and the specimen was placed in the EndoCatch and the bad was cinched. The bag was brought up to the trocar and then the trocar was removed, taking the specimen pouch with it. Pouch was removed intact and sent for pathology. Using a Josué-Michael, the left 10 mm trocar site had its fascia reapproximated with an 0 Vicryl suture. Inspection of the pelvis revealed the above findings and excellent hemostasis throughout. The pneumoperitoneum was then release from the abdomen and all instruments were removed under direct laparoscopic visualization. The 3 incision sites were all reapproximated with 4-0 Vicryl subcuticular sutures. Steri-Strips were placed. The Hulka tenaculum and the Thakkar catheter was removed. The tolerated the procedure well. Sponge, instruments and needle counts were correct x3. The patient take to the recovery  room, extubated in stable condition.      SPECIMENS:  Right paratubal cyst.         Jose Clark MD    D:  07/20/2017 14:09 T:  07/23/2017 16:58  Document:  [id number] LC\CK\BR

## 2021-01-18 ENCOUNTER — OFFICE VISIT (OUTPATIENT)
Dept: FAMILY MEDICINE | Facility: CLINIC | Age: 31
End: 2021-01-18

## 2021-01-18 VITALS
HEIGHT: 66 IN | RESPIRATION RATE: 16 BRPM | OXYGEN SATURATION: 99 % | SYSTOLIC BLOOD PRESSURE: 152 MMHG | BODY MASS INDEX: 38.57 KG/M2 | HEART RATE: 88 BPM | WEIGHT: 240 LBS | DIASTOLIC BLOOD PRESSURE: 92 MMHG

## 2021-01-18 DIAGNOSIS — Z13.6 SCREENING FOR HYPERTENSION: Primary | ICD-10-CM

## 2021-01-18 DIAGNOSIS — N92.1 MENORRHAGIA WITH IRREGULAR CYCLE: ICD-10-CM

## 2021-01-18 DIAGNOSIS — F33.1 MODERATE EPISODE OF RECURRENT MAJOR DEPRESSIVE DISORDER (H): ICD-10-CM

## 2021-01-18 PROCEDURE — 99204 OFFICE O/P NEW MOD 45 MIN: CPT | Performed by: FAMILY MEDICINE

## 2021-01-18 RX ORDER — SERTRALINE HYDROCHLORIDE 25 MG/1
12.5 TABLET, FILM COATED ORAL DAILY
Qty: 30 TABLET | Refills: 3 | Status: SHIPPED | OUTPATIENT
Start: 2021-01-18 | End: 2021-03-11

## 2021-01-18 ASSESSMENT — ANXIETY QUESTIONNAIRES
IF YOU CHECKED OFF ANY PROBLEMS ON THIS QUESTIONNAIRE, HOW DIFFICULT HAVE THESE PROBLEMS MADE IT FOR YOU TO DO YOUR WORK, TAKE CARE OF THINGS AT HOME, OR GET ALONG WITH OTHER PEOPLE: SOMEWHAT DIFFICULT
1. FEELING NERVOUS, ANXIOUS, OR ON EDGE: NEARLY EVERY DAY
5. BEING SO RESTLESS THAT IT IS HARD TO SIT STILL: MORE THAN HALF THE DAYS
7. FEELING AFRAID AS IF SOMETHING AWFUL MIGHT HAPPEN: MORE THAN HALF THE DAYS
6. BECOMING EASILY ANNOYED OR IRRITABLE: SEVERAL DAYS
2. NOT BEING ABLE TO STOP OR CONTROL WORRYING: MORE THAN HALF THE DAYS
3. WORRYING TOO MUCH ABOUT DIFFERENT THINGS: NEARLY EVERY DAY
GAD7 TOTAL SCORE: 15

## 2021-01-18 ASSESSMENT — MIFFLIN-ST. JEOR: SCORE: 1812.44

## 2021-01-18 ASSESSMENT — PATIENT HEALTH QUESTIONNAIRE - PHQ9
SUM OF ALL RESPONSES TO PHQ QUESTIONS 1-9: 15
5. POOR APPETITE OR OVEREATING: MORE THAN HALF THE DAYS

## 2021-01-18 NOTE — PATIENT INSTRUCTIONS
We have started you an antidepressant today called Zoloft (sertraline).   I am very hopeful that we will be able to help you feel much better over time.  I will need to see you monthly for a while to make sure the medications are working as intended and that you are making progress we both desire.  Also to meet state guidelines it is very important that we do an assessment within 5-7 months from today or approximately 7/17/21.  So I would like you to schedule two appointments today, one for a month and one for six months from now.  Call with any concerns.

## 2021-01-19 ASSESSMENT — ANXIETY QUESTIONNAIRES: GAD7 TOTAL SCORE: 15

## 2021-01-28 ENCOUNTER — OFFICE VISIT (OUTPATIENT)
Dept: FAMILY MEDICINE | Facility: CLINIC | Age: 31
End: 2021-01-28

## 2021-01-28 VITALS
WEIGHT: 237.38 LBS | BODY MASS INDEX: 38.15 KG/M2 | TEMPERATURE: 98.3 F | RESPIRATION RATE: 16 BRPM | HEART RATE: 78 BPM | OXYGEN SATURATION: 98 % | HEIGHT: 66 IN | DIASTOLIC BLOOD PRESSURE: 84 MMHG | SYSTOLIC BLOOD PRESSURE: 118 MMHG

## 2021-01-28 DIAGNOSIS — N92.1 MENOMETRORRHAGIA: Primary | ICD-10-CM

## 2021-01-28 DIAGNOSIS — E66.01 MORBID OBESITY (H): ICD-10-CM

## 2021-01-28 PROBLEM — I10 ESSENTIAL HYPERTENSION: Status: ACTIVE | Noted: 2017-04-10

## 2021-01-28 PROBLEM — N83.201 RIGHT OVARIAN CYST: Status: RESOLVED | Noted: 2017-07-12 | Resolved: 2021-01-28

## 2021-01-28 PROBLEM — F41.9 ANXIETY: Status: ACTIVE | Noted: 2017-05-11

## 2021-01-28 LAB
% GRANULOCYTES: 61.5 % (ref 42.2–75.2)
HCT VFR BLD AUTO: 42.8 % (ref 35–46)
HEMOGLOBIN: 14.1 G/DL (ref 11.8–15.5)
LYMPHOCYTES NFR BLD AUTO: 30.6 % (ref 20.5–51.1)
MCH RBC QN AUTO: 28.7 PG (ref 27–31)
MCHC RBC AUTO-ENTMCNC: 32.9 G/DL (ref 33–37)
MCV RBC AUTO: 87.2 FL (ref 80–100)
MONOCYTES NFR BLD AUTO: 7.9 % (ref 1.7–9.3)
PLATELET # BLD AUTO: 438 K/UL (ref 140–450)
RBC # BLD AUTO: 4.91 X10/CMM (ref 3.7–5.2)
WBC # BLD AUTO: 5.1 X10/CMM (ref 3.8–11)

## 2021-01-28 PROCEDURE — 36415 COLL VENOUS BLD VENIPUNCTURE: CPT | Performed by: NURSE PRACTITIONER

## 2021-01-28 PROCEDURE — 85025 COMPLETE CBC W/AUTO DIFF WBC: CPT | Performed by: NURSE PRACTITIONER

## 2021-01-28 PROCEDURE — 99214 OFFICE O/P EST MOD 30 MIN: CPT | Performed by: NURSE PRACTITIONER

## 2021-01-28 ASSESSMENT — MIFFLIN-ST. JEOR: SCORE: 1800.54

## 2021-01-28 NOTE — PROGRESS NOTES
"Problem(s) Oriented visit        SUBJECTIVE:                                                    Shagufta Cristina is a 30 year old female who presents to clinic today for the following health issues :    Periods - 9 to 11 days sometimes more, spotting in between, extremely painful abdominal, vaginal cramping and lower back pain. 2014 feels like the beginning. Cyst and fallopian tube removed. 2017 had another cyst removed from right ovary. Periods every 3 weeks. Not sexually active. Northindrone pill [id number]. States she wasn't prescribed estrogen containing pill due to elevated blood pressures. No history of migraine with aura or blood clots. Obesity with BMI = 38.90    Problem list, Medication list, Allergies, and Medical/Social/Surgical histories reviewed in [facility] and updated as appropriate.   Additional history: as documented    ROS:  3 point ROS completed and negative except noted above, including Gen, GI,     OBJECTIVE:                                                    /84   Pulse 78   Temp 98.3  F (36.8  C)   Resp 16   Ht 1.664 m (5' 5.5\")   Wt 107.7 kg (237 lb 6 oz)   LMP 01/14/2021 (Exact Date)   SpO2 98%   BMI 38.90 kg/m    Body mass index is 38.9 kg/m .   GENERAL: healthy, alert and no distress  RESP: lungs clear to auscultation - no rales, rhonchi or wheezes  CV: regular rates and rhythm, normal S1 S2, no S3 or S4 and no murmur, click or rub   (female): normal female external genitalia, normal urethral meatus , vaginal mucosa pink, moist, well rugated and normal cervix, adnexae, and uterus without masses.  PSYCH: affect normal/bright     ASSESSMENT/PLAN:                                                      BMI:   Estimated body mass index is 38.9 kg/m  as calculated from the following:    Height as of this encounter: 1.664 m (5' 5.5\").    Weight as of this encounter: 107.7 kg (237 lb 6 oz).   Weight management plan: Discussed healthy diet and exercise guidelines      Shagufta was seen today " for abnormal bleeding problem.    Diagnoses and all orders for this visit:    Menometrorrhagia  -     CBC with Diff/Plt ([facility])  -     Comp. Metabolic Panel (14) ([facility])  -     Lipid Panel ([facility])  -     Pap IG HPV rfx HPV 16 and 18,45 ([facility])  -     Ferritin  Serum ([facility])  -     Iron and TIBC ([facility])  -     Thyroid Panel With TSH ([facility])  -     Referral to [facility]  -     VENOUS COLLECTION  6+ year history of heavy, irregular periods. History of recurrent ovarian cysts requiring surgical removal. Due for pap which was done today. Labs done to evaluate for underlying cause. Referral for pelvic ultrasound. May need to refer to OB/GYN. Also consider estrogen containing oral contraceptive pills    Morbid obesity (H)  Diet & exercise briefly discussed      See Patient Instructions  Patient Instructions   Labs done today - will notify you with results    Pap results can take a couple weeks    Schedule pelvic ultrasound. Based on results of this may refer you to OB/GYN or consider estrogen containing oral birth control to try and regulate periods and decrease bleeding.    Thank you for coming in today!     We are working to improve our digital reputation.  Would you please help us by reviewing our clinic on Google and/or SparkBase?  These are links for filling out a review for the clinic:    [email or web address]                 [email or web address]    We truly appreciate you taking the time to do this.     General Information:    Today you had your appointment with Elda Lopez CNP  My hours are:    Monday 8 AM - 5 PM  Wednesday: 8 AM - 5 PM  Thursday: 8 AM - 5 PM  Fridays: 8 AM - 5 PM    I am not in the office Tuesdays. Therefore non urgent calls received on Tuesday will be addressed when I am back in the office on Wednesday.     If lab work was done today as part of your evaluation you will generally be contacted via My Chart, mail, or phone with  the results within 1-5 days. If there is an alarming result we will contact you by phone. Lab results come back at varying times, I generally wait until all labs are resulted before making comments on results. Please note labs are automatically released to My Chart once available.     If you need refills please contact your pharmacy They will send a refill request to me to review. Please allow 3 business days for us to process all refill requests.     Please call or send a medical message with any questions or concerns        DAQUAN Obrien CNP  [facility]  [facility]  [phone]    For any issues my office # is [phone]

## 2021-01-28 NOTE — PATIENT INSTRUCTIONS
Labs done today - will notify you with results    Pap results can take a couple weeks    Schedule pelvic ultrasound. Based on results of this may refer you to OB/GYN or consider estrogen containing oral birth control to try and regulate periods and decrease bleeding.    Thank you for coming in today!     We are working to improve our digital reputation.  Would you please help us by reviewing our clinic on Google and/or Facebook?  These are links for filling out a review for the clinic:    [email or web address]                 [email or web address]    We truly appreciate you taking the time to do this.     General Information:    Today you had your appointment with Elda Lopez CNP  My hours are:    Monday 8 AM - 5 PM  Wednesday: 8 AM - 5 PM  Thursday: 8 AM - 5 PM  Fridays: 8 AM - 5 PM    I am not in the office Tuesdays. Therefore non urgent calls received on Tuesday will be addressed when I am back in the office on Wednesday.     If lab work was done today as part of your evaluation you will generally be contacted via My Chart, mail, or phone with the results within 1-5 days. If there is an alarming result we will contact you by phone. Lab results come back at varying times, I generally wait until all labs are resulted before making comments on results. Please note labs are automatically released to My Chart once available.     If you need refills please contact your pharmacy They will send a refill request to me to review. Please allow 3 business days for us to process all refill requests.     Please call or send a medical message with any questions or concerns

## 2021-01-29 LAB
ALBUMIN SERPL-MCNC: 4.8 G/DL (ref 3.8–4.8)
ALBUMIN/GLOB SERPL: 1.5 {RATIO} (ref 1.2–2.2)
ALP SERPL-CCNC: 45 IU/L (ref 39–117)
ALT SERPL-CCNC: 35 IU/L (ref 0–32)
AST SERPL-CCNC: 23 IU/L (ref 0–40)
BILIRUB SERPL-MCNC: 0.2 MG/DL (ref 0–1.2)
BUN SERPL-MCNC: 12 MG/DL (ref 6–20)
BUN/CREATININE RATIO: 23 (ref 9–23)
CALCIUM SERPL-MCNC: 10 MG/DL (ref 8.7–10.2)
CHLORIDE SERPLBLD-SCNC: 101 MMOL/L (ref 96–106)
CHOLEST SERPL-MCNC: 192 MG/DL (ref 100–199)
CREAT SERPL-MCNC: 0.53 MG/DL (ref 0.57–1)
EGFR IF AFRICN AM: 146 ML/MIN/1.73
EGFR IF NONAFRICN AM: 127 ML/MIN/1.73
FERRITIN SERPL-MCNC: 40 NG/ML (ref 15–150)
FT4I SERPL CALC-MCNC: 1.5 (ref 1.2–4.9)
GLOBULIN, TOTAL: 3.3 G/DL (ref 1.5–4.5)
GLUCOSE SERPL-MCNC: 102 MG/DL (ref 65–99)
HDLC SERPL-MCNC: 56 MG/DL
IRON BINDING CAP: 416 UG/DL (ref 250–450)
IRON SATURATION: 16 % (ref 15–55)
IRON: 67 UG/DL (ref 27–159)
LDL/HDL RATIO: 2 RATIO (ref 0–3.2)
LDLC SERPL CALC-MCNC: 113 MG/DL (ref 0–99)
POTASSIUM SERPL-SCNC: 5.2 MMOL/L (ref 3.5–5.2)
PROT SERPL-MCNC: 8.1 G/DL (ref 6–8.5)
SODIUM SERPL-SCNC: 140 MMOL/L (ref 134–144)
T3RU NFR SERPL: 23 % (ref 24–39)
T4 TOTAL: 6.7 UG/DL (ref 4.5–12)
TOTAL CO2: 27 MMOL/L (ref 20–29)
TRIGL SERPL-MCNC: 128 MG/DL (ref 0–149)
TSH BLD-ACNC: 1.45 UIU/ML (ref 0.45–4.5)
UIBC: 349 UG/DL (ref 131–425)
VLDLC SERPL CALC-MCNC: 23 MG/DL (ref 5–40)

## 2021-02-02 ENCOUNTER — TRANSFERRED RECORDS (OUTPATIENT)
Dept: FAMILY MEDICINE | Facility: CLINIC | Age: 31
End: 2021-02-02

## 2021-02-05 LAB
.: NORMAL
CLINICIAN PROVIDED ICD10: NORMAL
DIAGNOSIS:: NORMAL
HPV APTIMA: NEGATIVE
Lab: NORMAL
PERFORMED BY:: NORMAL
SPECIMEN ADEQUACY:: NORMAL
TEST METHODOLOGY:: NORMAL

## 2021-03-11 DIAGNOSIS — F33.1 MODERATE EPISODE OF RECURRENT MAJOR DEPRESSIVE DISORDER (H): ICD-10-CM

## 2021-03-11 RX ORDER — SERTRALINE HYDROCHLORIDE 25 MG/1
TABLET, FILM COATED ORAL
Qty: 30 TABLET | Refills: 3 | Status: SHIPPED | OUTPATIENT
Start: 2021-03-11 | End: 2021-09-07

## 2021-03-14 ENCOUNTER — HEALTH MAINTENANCE LETTER (OUTPATIENT)
Age: 31
End: 2021-03-14

## 2021-04-02 NOTE — PROGRESS NOTES
In 2014 had a huge cyst in the fallopian removed size of a grapefruit.  And second cyst surgery on ovary in 2017.    Cycles areabnormal periods - painful (cramps, lower back pain) and long (10 or more days), very heavy periods (occ has to change super tampon hourly). has had ovarian cysts before and had to have removed (x2    Depression:  Has known history of depression.  Has been on medication for this not currently.  The patient does not report any significant side effects of this medication.  The prior symptoms leading to the original diagnosis and decision to start medication therapy are not yet optimal.     Appetite is stable.  Sleeping patterns are stable.  No reported thoughts of suicide or homicide.  Last PHQ-9 score on record=   PHQ-9 SCORE 1/18/2021   PHQ-9 Total Score 15     Problem(s) Oriented visit      ROS:  General and Resp. completed and negative except as noted above     HISTORY:   reports current alcohol use.   reports that she has quit smoking. Her smoking use included cigarettes. She has a 1.75 pack-year smoking history. She has never used smokeless tobacco.    Past Medical History:   Diagnosis Date     History of unilateral salpingectomy 9/5/2014     Right ovarian cyst 7/12/2017     Past Surgical History:   Procedure Laterality Date     DAVINCI PELVIC PROCEDURE Right 7/20/2017    Procedure: DAVINCI PELVIC PROCEDURE;  Anticipated Davinci robotic-assisted laparoscopic right ovarian cystectomy converted to Laparoscopic right paratubal cystectomy, Extensive Lysis of adhesions;  Surgeon: Jose Clark MD;  Location: [facility] OR     LAPAROSCOPIC CHOLECYSTECTOMY N/A 11/15/2016    Procedure: LAPAROSCOPIC CHOLECYSTECTOMY;  Surgeon: Jean Paul Acosta MD;  Location: [facility] OR     LAPAROTOMY EXPLORATORY  9/5/14    Exp Lap, partial left salpingectomy for adnexal mass     LAPAROTOMY EXPLORATORY N/A 9/4/2014    Procedure: LAPAROTOMY EXPLORATORY;  Surgeon: Jose Clark MD;  Location: [facility] OR     SALPINGECTOMY  "Left 9/5/14    Exp Lap, partial left salpingectomy for adnexal mass     SALPINGECTOMY Left 9/4/2014    Procedure: SALPINGECTOMY;  Surgeon: Jose Clark MD;  Location: [facility] OR     wisdom teeth         EXAM:  BP: 152/92   Pulse: 88    Temp: Data Unavailable    Wt Readings from Last 2 Encounters:   01/18/21 108.9 kg (240 lb)   07/20/17 90.3 kg (199 lb)       BMI= Body mass index is 39.33 kg/m .    EXAM:  APPEARANCE: = Relaxed and in no distress      Assessment/Plan:  Shagufta was seen today for new patient, anxiety and abnormal bleeding problem.    Diagnoses and all orders for this visit:    Screening for hypertension    Moderate episode of recurrent major depressive disorder (H)  Spoke at length about mood disorders including suspected pathophysiology, role of neurotransmitters, and treatment options including medication options ( especially SSRIs that treat cause of sx) and counselling.  Tolerating current meds. We discussed ongoing management of her  medications and how we will refill the medications now and in the future.    -     DEPRESSION ACTION PLAN (DAP)  -     sertraline (ZOLOFT) 25 MG tablet; Take 0.5 tablets (12.5 mg) by mouth daily    Menorrhagia with irregular cycle  I will have her schedule a visit with Isabella to work on these painful issues with her.      COUNSELING:   reports that she has quit smoking. Her smoking use included cigarettes. She has a 1.75 pack-year smoking history. She has never used smokeless tobacco.    Estimated body mass index is 39.33 kg/m  as calculated from the following:    Height as of this encounter: 1.664 m (5' 5.5\").    Weight as of this encounter: 108.9 kg (240 lb).   Weight management plan: Discussed healthy diet and exercise guidelines    Appropriate preventive services were discussed with this patient, including applicable screening as appropriate for cardiovascular disease, diabetes, osteopenia/osteoporosis, and glaucoma.  As appropriate for age/gender, discussed " screening for colorectal cancer, prostate cancer, breast cancer, and cervical cancer. Checklist reviewing preventive services available has been given to the patient.    Reviewed patients plan of care and provided an AVS. The Basic Care Plan (routine screening as documented in Health Maintenance) for Shagufta meets the Care Plan requirement. This Care Plan has been established and reviewed with the  Patient.      The following health maintenance items are reviewed in Epic and correct as of today:  Health Maintenance   Topic Date Due     PREVENTIVE CARE VISIT  1989     HEPATITIS B IMMUNIZATION (3 of 3 - 3-dose primary series) 08/22/2000     HIV SCREENING  08/01/2004     HEPATITIS C SCREENING  08/01/2007     PAP  08/01/2010     DTAP/TDAP/TD IMMUNIZATION (5 - Td) 04/20/2027     PHQ-2  Completed     INFLUENZA VACCINE  Addressed     Pneumococcal Vaccine: Pediatrics (0 to 5 Years) and At-Risk Patients (6 to 64 Years)  Aged Out     IPV IMMUNIZATION  Aged Out     MENINGITIS IMMUNIZATION  Aged Out       Fahad Ayers  [facility]  For any issues my office # is [phone]             poor minus

## 2021-09-06 DIAGNOSIS — F33.1 MODERATE EPISODE OF RECURRENT MAJOR DEPRESSIVE DISORDER (H): ICD-10-CM

## 2021-09-07 RX ORDER — SERTRALINE HYDROCHLORIDE 25 MG/1
TABLET, FILM COATED ORAL
Qty: 45 TABLET | Refills: 2 | Status: SHIPPED | OUTPATIENT
Start: 2021-09-07 | End: 2022-05-12

## 2021-10-24 ENCOUNTER — HEALTH MAINTENANCE LETTER (OUTPATIENT)
Age: 31
End: 2021-10-24

## 2022-04-10 ENCOUNTER — HEALTH MAINTENANCE LETTER (OUTPATIENT)
Age: 32
End: 2022-04-10

## 2022-05-12 ENCOUNTER — OFFICE VISIT (OUTPATIENT)
Dept: FAMILY MEDICINE | Facility: CLINIC | Age: 32
End: 2022-05-12

## 2022-05-12 VITALS
RESPIRATION RATE: 16 BRPM | OXYGEN SATURATION: 98 % | HEART RATE: 94 BPM | BODY MASS INDEX: 40.31 KG/M2 | SYSTOLIC BLOOD PRESSURE: 146 MMHG | WEIGHT: 246 LBS | DIASTOLIC BLOOD PRESSURE: 82 MMHG

## 2022-05-12 DIAGNOSIS — F33.1 MODERATE EPISODE OF RECURRENT MAJOR DEPRESSIVE DISORDER (H): ICD-10-CM

## 2022-05-12 DIAGNOSIS — E66.01 MORBID OBESITY (H): ICD-10-CM

## 2022-05-12 DIAGNOSIS — F43.22 ADJUSTMENT DISORDER WITH ANXIOUS MOOD: Primary | ICD-10-CM

## 2022-05-12 PROCEDURE — 99214 OFFICE O/P EST MOD 30 MIN: CPT | Performed by: FAMILY MEDICINE

## 2022-05-12 RX ORDER — SERTRALINE HYDROCHLORIDE 25 MG/1
12.5 TABLET, FILM COATED ORAL DAILY
Qty: 45 TABLET | Refills: 3 | Status: SHIPPED | OUTPATIENT
Start: 2022-05-12 | End: 2023-05-29

## 2022-05-12 RX ORDER — LORAZEPAM 0.5 MG/1
0.5 TABLET ORAL DAILY PRN
Qty: 16 TABLET | Refills: 1 | Status: SHIPPED | OUTPATIENT
Start: 2022-05-12 | End: 2023-06-19

## 2022-05-12 ASSESSMENT — PATIENT HEALTH QUESTIONNAIRE - PHQ9: SUM OF ALL RESPONSES TO PHQ QUESTIONS 1-9: 3

## 2022-05-12 NOTE — PATIENT INSTRUCTIONS
Hypertension   What is hypertension?   Hypertension is blood pressure that keeps being higher than normal. Blood pressure is the force of blood against artery walls as the heart pumps blood through the body. Blood pressure can be unhealthy if it is above 120/80. The higher your blood pressure, the greater the health risk.   High blood pressure can be controlled if you take these steps:   Maintain a healthy weight.   Are physically active.   Follow a healthy eating plan, which includes foods that do not have a lot of salt and sodium.   Do not drink a lot of alcohol.   Our goal is to keep the systolic (top) number 128 or lower and the diastolic (bottom) number 83 or lower

## 2022-05-12 NOTE — PROGRESS NOTES
Answers for HPI/ROS submitted by the patient on 5/12/2022  On average, how many sweetened beverages do you drink each day (Examples: soda, juice, sweet tea, etc.  Do NOT count diet or artificially sweetened beverages)?: 0  How many minutes a day do you exercise enough to make your heart beat faster?: 20 to 29  How many days a week do you exercise enough to make your heart beat faster?: 3 or less  How many days per week do you miss taking your medication?: 0  What is the reason for your visit today?: Taking a work trip 5/23-5/26 and going on my first flight. My boss recommended seeing if I can get something for my nerves. I emailed and they stated it d be best to discuss in an office visit.  What are your symptoms?: N/A  Problem(s) Oriented visit      Depression:  Has known history of depression.  Has been on medication for this.  The patient does not report any significant side effects of this medication.  The prior symptoms leading to the original diagnosis and decision to start medication therapy are better.     Appetite is stable.  Sleeping patterns are stable.  No reported thoughts of suicide or homicide.    Last 3 PHQ9 results:  PHQ-9 SCORE 1/18/2021 7/1/2021 5/12/2022   PHQ-9 Total Score MyChart - 2 (Minimal depression) -   PHQ-9 Total Score 15 2 3       ROS:  General and Resp. completed and negative except as noted above     HISTORY:   reports current alcohol use.   reports that she has quit smoking. Her smoking use included cigarettes. She has a 1.75 pack-year smoking history. She has never used smokeless tobacco.    Past Medical History:   Diagnosis Date     History of unilateral salpingectomy 9/5/2014     Right ovarian cyst 7/12/2017     Past Surgical History:   Procedure Laterality Date     DAVINCI PELVIC PROCEDURE Right 7/20/2017    Procedure: DAVINCI PELVIC PROCEDURE;  Anticipated Davinci robotic-assisted laparoscopic right ovarian cystectomy converted to Laparoscopic right paratubal cystectomy,  Extensive Lysis of adhesions;  Surgeon: Jose Clark MD;  Location: [facility] OR     LAPAROSCOPIC CHOLECYSTECTOMY N/A 11/15/2016    Procedure: LAPAROSCOPIC CHOLECYSTECTOMY;  Surgeon: Jean Paul Acosta MD;  Location: [facility] OR     LAPAROTOMY EXPLORATORY  9/5/14    Exp Lap, partial left salpingectomy for adnexal mass     LAPAROTOMY EXPLORATORY N/A 9/4/2014    Procedure: LAPAROTOMY EXPLORATORY;  Surgeon: Jose Clark MD;  Location: [facility] OR     SALPINGECTOMY Left 9/5/14    Exp Lap, partial left salpingectomy for adnexal mass     SALPINGECTOMY Left 9/4/2014    Procedure: SALPINGECTOMY;  Surgeon: Jose Clark MD;  Location: [facility] OR     wisdom teeth         EXAM:  BP: 146/82   Pulse: 94    Temp: Data Unavailable    Wt Readings from Last 2 Encounters:   05/12/22 111.6 kg (246 lb)   01/28/21 107.7 kg (237 lb 6 oz)       BMI= Body mass index is 40.31 kg/m .    EXAM:  APPEARANCE: = Relaxed and in no distress      Assessment/Plan:  Shagufta was seen today for recheck medication.    Diagnoses and all orders for this visit:    Adjustment disorder with anxious mood  Discussed fear of flying in detail.  Will use Lorazepam as needed, discussed side effects of drowsiness, mental confusion/fogginess, additive effect of alcohol, and not to drive on this over use dangerous activity until she knows how the medication will affect her.  Discussed behavioral techniques for her to try in overcoming her fear.  Recommended seeking either books, tapes, or podcasts on how to overcome fear of flying, or even seeking professional counseling to help with this matter.      -     LORazepam (ATIVAN) 0.5 MG tablet; Take 1 tablet (0.5 mg) by mouth daily as needed for anxiety (30 minutes prior to flight)    Moderate episode of recurrent major depressive disorder (H)  Spoke at length about mood disorders including suspected pathophysiology, role of neurotransmitters, and treatment options including medication options ( especially SSRIs that treat cause  "of sx) and counselling.  Tolerating current meds. We discussed ongoing management of her  medications and how we will refill the medications now and in the future.    -     sertraline (ZOLOFT) 25 MG tablet; Take 0.5 tablets (12.5 mg) by mouth daily    Morbid obesity (H)  Current BMI is: Body mass index is 40.31 kg/m ..  Reviewed weight patterns.   Obesity as a biological preventable and treatable disease, which is associated with significantly increased risk of many acute and chronic health conditions.   Obesity has now been recognized as a chronic disease by the American Medical Association.  Obesity has serious co-morbidities associated with obesity including increased risk for hypertension, stroke, coronary artery disease, dyslipidemia, Type II diabetes, depression, sleep apnea, cancers of the colon, breast and endometrium, obstructive sleep apnea, osteoarthritis and female infertility.  Recommended regular aerobic exercise, recommended improved diet aiming at lowering amount of processed foods, lower sugars, moderation/reduction of simple carbs and fat in the diet, establishing more regular meal times, always eating breakfast, front loading some of the calories and adding more protein to the diet.               COUNSELING:   reports that she has quit smoking. Her smoking use included cigarettes. She has a 1.75 pack-year smoking history. She has never used smokeless tobacco.    Estimated body mass index is 40.31 kg/m  as calculated from the following:    Height as of 1/28/21: 1.664 m (5' 5.5\").    Weight as of this encounter: 111.6 kg (246 lb).   Weight management plan: Discussed healthy diet and exercise guidelines    Appropriate preventive services were discussed with this patient, including applicable screening as appropriate for cardiovascular disease, diabetes, osteopenia/osteoporosis, and glaucoma.  As appropriate for age/gender, discussed screening for colorectal cancer, prostate cancer, breast cancer, and " cervical cancer. Checklist reviewing preventive services available has been given to the patient.    Reviewed patients plan of care and provided an AVS. The Basic Care Plan (routine screening as documented in Health Maintenance) for Shagufta meets the Care Plan requirement. This Care Plan has been established and reviewed with the  Patient.      The following health maintenance items are reviewed in Epic and correct as of today:  Health Maintenance   Topic Date Due     PREVENTIVE CARE VISIT  Never done     ADVANCE CARE PLANNING  Never done     COVID-19 Vaccine (1) Never done     HEPATITIS B IMMUNIZATION (3 of 3 - 3-dose primary series) 08/22/2000     HIV SCREENING  Never done     HEPATITIS C SCREENING  Never done     INFLUENZA VACCINE (Season Ended) 09/01/2022     PHQ-9  11/12/2022     PAP  01/28/2026     DTAP/TDAP/TD IMMUNIZATION (5 - Td or Tdap) 04/20/2027     DEPRESSION ACTION PLAN  Completed     Pneumococcal Vaccine: Pediatrics (0 to 5 Years) and At-Risk Patients (6 to 64 Years)  Aged Out     IPV IMMUNIZATION  Aged Out     MENINGITIS IMMUNIZATION  Aged Out       Fahad Ayers  [facility]  For any issues my office # is [phone]

## 2022-10-15 ENCOUNTER — HEALTH MAINTENANCE LETTER (OUTPATIENT)
Age: 32
End: 2022-10-15

## 2023-05-29 DIAGNOSIS — F33.1 MODERATE EPISODE OF RECURRENT MAJOR DEPRESSIVE DISORDER (H): ICD-10-CM

## 2023-05-29 RX ORDER — SERTRALINE HYDROCHLORIDE 25 MG/1
TABLET, FILM COATED ORAL
Qty: 45 TABLET | Refills: 3 | Status: SHIPPED | OUTPATIENT
Start: 2023-05-29 | End: 2024-05-22

## 2023-06-01 ENCOUNTER — HEALTH MAINTENANCE LETTER (OUTPATIENT)
Age: 33
End: 2023-06-01

## 2023-06-19 DIAGNOSIS — F43.22 ADJUSTMENT DISORDER WITH ANXIOUS MOOD: ICD-10-CM

## 2023-06-19 RX ORDER — LORAZEPAM 0.5 MG/1
0.5 TABLET ORAL DAILY PRN
Qty: 16 TABLET | Refills: 1 | Status: SHIPPED | OUTPATIENT
Start: 2023-06-19 | End: 2023-08-03

## 2023-08-03 ENCOUNTER — MYC MEDICAL ADVICE (OUTPATIENT)
Dept: FAMILY MEDICINE | Facility: CLINIC | Age: 33
End: 2023-08-03

## 2023-08-03 ENCOUNTER — VIRTUAL VISIT (OUTPATIENT)
Dept: FAMILY MEDICINE | Facility: CLINIC | Age: 33
End: 2023-08-03

## 2023-08-03 DIAGNOSIS — F33.1 MODERATE EPISODE OF RECURRENT MAJOR DEPRESSIVE DISORDER (H): ICD-10-CM

## 2023-08-03 DIAGNOSIS — F40.243 FEAR OF FLYING: Primary | ICD-10-CM

## 2023-08-03 DIAGNOSIS — F43.22 ADJUSTMENT DISORDER WITH ANXIOUS MOOD: ICD-10-CM

## 2023-08-03 PROCEDURE — 99213 OFFICE O/P EST LOW 20 MIN: CPT | Mod: 95

## 2023-08-03 RX ORDER — LORAZEPAM 0.5 MG/1
.5-1 TABLET ORAL PRN
Qty: 16 TABLET | Refills: 0 | Status: SHIPPED | OUTPATIENT
Start: 2023-08-03

## 2023-08-03 NOTE — PROGRESS NOTES
Shagufta is a 33 year old who is being evaluated via a billable video visit.      How would you like to obtain your AVS? MyChart  If the video visit is dropped, the invitation should be resent by: Text to cell phone: [phone]  Will anyone else be joining your video visit? No          Assessment & Plan     Fear of flying  -patient travels for work and is incapable of getting on plane due to anxiety  -Education about adverse effects of prescription medication discussed  -Red flags that warrant emergent evaluation discussed  -Patient verbalized understanding and is agreeable to the discussed plan of care.  - LORazepam (ATIVAN) 0.5 MG tablet  Dispense: 16 tablet; Refill: 0    Adjustment disorder with anxious mood  -stable on sertraline    Moderate episode of recurrent major depressive disorder (H)  -stable on sertraline      Prescription drug management         See Patient Instructions    No follow-ups on file.    DAQUAN Pugh [facility]    Subjective   Shagufta is a 33 year old, presenting for the following health issues:  Recheck Medication    HPI     Anxiety Follow-Up  How are you doing with your anxiety since your last visit? No change  Are you having other symptoms that might be associated with anxiety? No  Have you had a significant life event? OTHER: flying for work this week    Are you feeling depressed? No  Do you have any concerns with your use of alcohol or other drugs? No    Social History     Tobacco Use    Smoking status: Former     Packs/day: 0.25     Years: 7.00     Pack years: 1.75     Types: Cigarettes    Smokeless tobacco: Never   Substance Use Topics    Alcohol use: Yes     Alcohol/week: 0.0 standard drinks of alcohol     Comment: very rare    Drug use: No         1/18/2021    12:05 PM   MARLIN-7 SCORE   Total Score 15         5/12/2022    11:39 AM 6/25/2022     6:59 AM 9/21/2022     1:05 PM   PHQ   PHQ-9 Total Score 3 4 6   Q9: Thoughts of better off dead/self-harm past 2 weeks  Not at all Not at all Not at all         9/21/2022     1:05 PM   Last PHQ-9   1.  Little interest or pleasure in doing things 1   2.  Feeling down, depressed, or hopeless 1   3.  Trouble falling or staying asleep, or sleeping too much 0   4.  Feeling tired or having little energy 1   5.  Poor appetite or overeating 1   6.  Feeling bad about yourself 1   7.  Trouble concentrating 1   8.  Moving slowly or restless 0   Q9: Thoughts of better off dead/self-harm past 2 weeks 0   PHQ-9 Total Score 6         1/18/2021    12:05 PM   MARLIN-7    1. Feeling nervous, anxious, or on edge 3   2. Not being able to stop or control worrying 2   3. Worrying too much about different things 3   4. Trouble relaxing 2   5. Being so restless that it is hard to sit still 2   6. Becoming easily annoyed or irritable 1   7. Feeling afraid, as if something awful might happen 2   MARLIN-7 Total Score 15   If you checked any problems, how difficult have they made it for you to do your work, take care of things at home, or get along with other people? Somewhat difficult           Review of Systems   CONSTITUTIONAL: NEGATIVE for fever, chills, change in weight  ENT/MOUTH: NEGATIVE for ear, mouth and throat problems  RESP: NEGATIVE for significant cough or SOB  CV: NEGATIVE for chest pain, palpitations or peripheral edema      Objective           Vitals:  No vitals were obtained today due to virtual visit.    Physical Exam   GENERAL: Healthy, alert and no distress  EYES: Eyes grossly normal to inspection.  No discharge or erythema, or obvious scleral/conjunctival abnormalities.  RESP: No audible wheeze, cough, or visible cyanosis.  No visible retractions or increased work of breathing.    SKIN: Visible skin clear. No significant rash, abnormal pigmentation or lesions.  NEURO: Cranial nerves grossly intact.  Mentation and speech appropriate for age.  PSYCH: Mentation appears normal, affect normal/bright, judgement and insight intact, normal speech and  appearance well-groomed.                Video-Visit Details    Type of service:  Video Visit     Originating Location (pt. Location): Home    Distant Location (provider location):  On-site  Platform used for Video Visit: EmilGiorgio    Shagufta was seen today for recheck medication.    Diagnoses and all orders for this visit:    Fear of flying  -     LORazepam (ATIVAN) 0.5 MG tablet; Take 1-2 tablets (0.5-1 mg) by mouth as needed for anxiety (take 30 minutes prior to flying)    Adjustment disorder with anxious mood    Moderate episode of recurrent major depressive disorder (H)

## 2024-05-22 DIAGNOSIS — F33.1 MODERATE EPISODE OF RECURRENT MAJOR DEPRESSIVE DISORDER (H): ICD-10-CM

## 2024-05-22 RX ORDER — SERTRALINE HYDROCHLORIDE 25 MG/1
12.5 TABLET, FILM COATED ORAL DAILY
Qty: 45 TABLET | Refills: 3 | Status: SHIPPED | OUTPATIENT
Start: 2024-05-22

## 2024-05-22 NOTE — TELEPHONE ENCOUNTER
Med: Sertraline      LOV (related): 8/3/23      Due for F/U around:  due for CPX      Next Appt: No current future appointments scheduled             5/12/2022    11:39 AM 6/25/2022     6:59 AM 9/21/2022     1:05 PM   PHQ   PHQ-9 Total Score 3 4 6   Q9: Thoughts of better off dead/self-harm past 2 weeks Not at all Not at all Not at all           1/18/2021    12:05 PM   MARLIN-7 SCORE   Total Score 15

## 2024-08-04 ENCOUNTER — HEALTH MAINTENANCE LETTER (OUTPATIENT)
Age: 34
End: 2024-08-04

## (undated) DEVICE — ESU HANDPIECE BIPOLAR THUNDERBEAT FC 5MMX35CM TB-0535FC

## (undated) DEVICE — PACK DAVINCI GYN SMA15GDFS1

## (undated) DEVICE — GLOVE PROTEXIS BLUE W/NEU-THERA 6.5  2D73EB65

## (undated) DEVICE — Device

## (undated) DEVICE — DAVINCI DRAPE KIT 3-ARM 420256

## (undated) DEVICE — LINEN TOWEL PACK X10 5473

## (undated) DEVICE — SU VICRYL 4-0 PS-2 18" UND J496H

## (undated) DEVICE — SOL WATER IRRIG 1000ML BOTTLE 07139-09

## (undated) DEVICE — LINEN POUCH DBL 5427

## (undated) DEVICE — GLOVE PROTEXIS MICRO 7.0  2D73PM70

## (undated) DEVICE — PROTECTOR ARM ONE-STEP TRENDELENBURG 40418

## (undated) DEVICE — PREP DURAPREP 26ML APL 8630

## (undated) DEVICE — SU VICRYL 0 TIE 12X18" J906G

## (undated) DEVICE — ESU CORD BIPOLAR GREEN 10-4000

## (undated) DEVICE — SOL NACL 0.9% INJ 1000ML BAG 2B1324X

## (undated) DEVICE — DAVINCI HOT SHEARS TIP COVER  400180

## (undated) DEVICE — ENDO POUCH 5X9" 15MM ENDOCATCH II 173049

## (undated) DEVICE — DAVINCI S CANNULA SEAL 8.5-13MM 420206

## (undated) DEVICE — GLOVE PROTEXIS W/NEU-THERA 7.0  2D73TE70

## (undated) DEVICE — KIT PATIENT POSITIONING PIGAZZI LATEX FREE 40580

## (undated) DEVICE — NDL INSUFFLATION 14GA STEP S100000

## (undated) DEVICE — PREP LOTION REMOVER 0.5OZ 8610

## (undated) DEVICE — ENDO TROCAR  5-11MM VERSAPORT PLUS W/FIX CAN NB11STF

## (undated) DEVICE — ESU CORD MONOPOLAR 10'  E0510

## (undated) DEVICE — INSUFFLATION TUBING

## (undated) DEVICE — GLOVE ESTEEM POWDER FREE SMT 8.0  2D72PT80

## (undated) DEVICE — DAVINCI OBTURATOR 8MM BLADELESS 420023

## (undated) DEVICE — CATH TRAY FOLEY SURESTEP 16FR DRAIN BAG STATOCK A899916

## (undated) DEVICE — ESU GROUND PAD ADULT W/CORD E7507

## (undated) DEVICE — SUCTION IRR STRYKERFLOW II W/TIP 250-070-520

## (undated) DEVICE — SOL NACL 0.9% IRRIG 1000ML BOTTLE 07138-09

## (undated) RX ORDER — OXYCODONE AND ACETAMINOPHEN 5; 325 MG/1; MG/1
TABLET ORAL
Status: DISPENSED
Start: 2017-07-20

## (undated) RX ORDER — PROPOFOL 10 MG/ML
INJECTION, EMULSION INTRAVENOUS
Status: DISPENSED
Start: 2017-07-20

## (undated) RX ORDER — ACETAMINOPHEN 325 MG/1
TABLET ORAL
Status: DISPENSED
Start: 2017-07-20

## (undated) RX ORDER — FENTANYL CITRATE 50 UG/ML
INJECTION, SOLUTION INTRAMUSCULAR; INTRAVENOUS
Status: DISPENSED
Start: 2017-07-20

## (undated) RX ORDER — ONDANSETRON 2 MG/ML
INJECTION INTRAMUSCULAR; INTRAVENOUS
Status: DISPENSED
Start: 2017-07-20

## (undated) RX ORDER — BUPIVACAINE HYDROCHLORIDE 5 MG/ML
INJECTION, SOLUTION EPIDURAL; INTRACAUDAL
Status: DISPENSED
Start: 2017-07-20

## (undated) RX ORDER — CEFAZOLIN SODIUM 1 G/3ML
INJECTION, POWDER, FOR SOLUTION INTRAMUSCULAR; INTRAVENOUS
Status: DISPENSED
Start: 2017-07-20

## (undated) RX ORDER — GLYCOPYRROLATE 0.2 MG/ML
INJECTION INTRAMUSCULAR; INTRAVENOUS
Status: DISPENSED
Start: 2017-07-20

## (undated) RX ORDER — DEXAMETHASONE SODIUM PHOSPHATE 4 MG/ML
INJECTION, SOLUTION INTRA-ARTICULAR; INTRALESIONAL; INTRAMUSCULAR; INTRAVENOUS; SOFT TISSUE
Status: DISPENSED
Start: 2017-07-20

## (undated) RX ORDER — NEOSTIGMINE METHYLSULFATE 5 MG/5 ML
SYRINGE (ML) INTRAVENOUS
Status: DISPENSED
Start: 2017-07-20

## (undated) RX ORDER — CEFAZOLIN SODIUM 2 G/100ML
INJECTION, SOLUTION INTRAVENOUS
Status: DISPENSED
Start: 2017-07-20